# Patient Record
Sex: MALE | Race: WHITE
[De-identification: names, ages, dates, MRNs, and addresses within clinical notes are randomized per-mention and may not be internally consistent; named-entity substitution may affect disease eponyms.]

---

## 2017-08-08 ENCOUNTER — HOSPITAL ENCOUNTER (OUTPATIENT)
Dept: HOSPITAL 63 - DXRADRC | Age: 27
Discharge: HOME | End: 2017-08-08
Attending: NURSE PRACTITIONER
Payer: COMMERCIAL

## 2017-08-08 DIAGNOSIS — M25.572: Primary | ICD-10-CM

## 2017-08-08 PROCEDURE — 73610 X-RAY EXAM OF ANKLE: CPT

## 2017-08-08 NOTE — RAD
Indication ankle pain for several months.



AP oblique and lateral imaging of the left ankle was performed.



No bony abnormality is seen

## 2019-07-01 ENCOUNTER — HOSPITAL ENCOUNTER (OUTPATIENT)
Dept: HOSPITAL 63 - DXRAD | Age: 29
Discharge: HOME | End: 2019-07-01
Attending: NURSE PRACTITIONER
Payer: COMMERCIAL

## 2019-07-01 DIAGNOSIS — M25.571: Primary | ICD-10-CM

## 2019-07-01 PROCEDURE — 73600 X-RAY EXAM OF ANKLE: CPT
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
## 2020-07-07 ENCOUNTER — HOSPITAL ENCOUNTER (EMERGENCY)
Dept: HOSPITAL 63 - ER | Age: 30
Discharge: HOME | End: 2020-07-07
Payer: COMMERCIAL

## 2020-07-07 VITALS — HEIGHT: 68 IN | BODY MASS INDEX: 26.06 KG/M2 | WEIGHT: 171.96 LBS

## 2020-07-07 VITALS
SYSTOLIC BLOOD PRESSURE: 126 MMHG | DIASTOLIC BLOOD PRESSURE: 76 MMHG | DIASTOLIC BLOOD PRESSURE: 76 MMHG | SYSTOLIC BLOOD PRESSURE: 126 MMHG
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
## 2020-07-07 DIAGNOSIS — S60.032A: Primary | ICD-10-CM
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
## 2020-07-07 DIAGNOSIS — S60.042A: ICD-10-CM
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
## 2020-07-07 DIAGNOSIS — Z88.5: ICD-10-CM

## 2020-07-07 DIAGNOSIS — Z77.21: ICD-10-CM
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
## 2020-07-07 PROCEDURE — 86705 HEP B CORE ANTIBODY IGM: CPT

## 2020-07-07 PROCEDURE — 87340 HEPATITIS B SURFACE AG IA: CPT

## 2020-07-07 PROCEDURE — 86703 HIV-1/HIV-2 1 RESULT ANTBDY: CPT

## 2020-07-07 PROCEDURE — 99283 EMERGENCY DEPT VISIT LOW MDM: CPT

## 2020-07-07 PROCEDURE — 86803 HEPATITIS C AB TEST: CPT

## 2020-07-07 PROCEDURE — 86709 HEPATITIS A IGM ANTIBODY: CPT
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
## 2022-03-15 ENCOUNTER — HOSPITAL ENCOUNTER (OUTPATIENT)
Dept: HOSPITAL 63 - CT | Age: 32
End: 2022-03-15
Attending: PREVENTIVE MEDICINE
Payer: COMMERCIAL

## 2022-03-15 DIAGNOSIS — J84.10: Primary | ICD-10-CM

## 2022-03-15 DIAGNOSIS — U09.9: ICD-10-CM

## 2022-03-15 DIAGNOSIS — M47.814: ICD-10-CM

## 2022-03-15 PROCEDURE — 71250 CT THORAX DX C-: CPT

## 2022-03-15 NOTE — RAD
EXAM: High-resolution chest CT without intravenous contrast.



HISTORY: Pulmonary fibrosis. Covid 19.



TECHNIQUE: Computed tomographic images of the chest were obtained without contrast. Inspiratory and e
xpiratory high-resolution images were obtained Multiplanar reformatting was performed.



*One or more of the following individualized dose reduction techniques were utilized for this examina
tion:  

1. Automated exposure control.  

2. Adjustment of the mA and/or kV according to patient size.  

3. Use of iterative reconstruction technique.



COMPARISON: None.



FINDINGS: The heart is normal in size. The aorta is normal in caliber. There is slight increased soft
 tissue density within the anterior mediastinum due to residual thymus or lymphatic in etiology. No p
athologically enlarged lymph node is seen. There is no pneumothorax or pleural effusion. There is a m
ultifocal lower lobe predominant groundglass opacity throughout both lungs. There is no consolidation
. There is no suspicious pulmonary nodule. There is no bronchiectasis or bronchial wall thickening. T
here is no acute finding involving the upper abdomen. There is no acute or suspicious osseous finding
. There are degenerative changes involving the mid thoracic spine.



IMPRESSION:  Nonspecific lower lobe predominant bilateral groundglass opacities, likely due to atypic
al infection given a history of Covid 19. There may be superimposed air trapping due to small airways
 disease.



Electronically signed by: Jaylyn Colbert MD (3/15/2022 1:06 PM) [id number]